# Patient Record
Sex: FEMALE | Race: BLACK OR AFRICAN AMERICAN | NOT HISPANIC OR LATINO | Employment: UNEMPLOYED | URBAN - METROPOLITAN AREA
[De-identification: names, ages, dates, MRNs, and addresses within clinical notes are randomized per-mention and may not be internally consistent; named-entity substitution may affect disease eponyms.]

---

## 2020-09-07 ENCOUNTER — OFFICE VISIT (OUTPATIENT)
Dept: URGENT CARE | Facility: CLINIC | Age: 20
End: 2020-09-07
Payer: MEDICAID

## 2020-09-07 ENCOUNTER — HOSPITAL ENCOUNTER (EMERGENCY)
Facility: OTHER | Age: 20
Discharge: HOME | End: 2020-09-07
Attending: EMERGENCY MEDICINE
Payer: MEDICAID

## 2020-09-07 VITALS
RESPIRATION RATE: 16 BRPM | HEART RATE: 73 BPM | WEIGHT: 140 LBS | TEMPERATURE: 98 F | OXYGEN SATURATION: 100 % | DIASTOLIC BLOOD PRESSURE: 86 MMHG | BODY MASS INDEX: 24.8 KG/M2 | HEIGHT: 63 IN | SYSTOLIC BLOOD PRESSURE: 132 MMHG

## 2020-09-07 DIAGNOSIS — O20.0 THREATENED MISCARRIAGE: Primary | ICD-10-CM

## 2020-09-07 DIAGNOSIS — O36.80X0 PREGNANCY OF UNKNOWN ANATOMIC LOCATION: ICD-10-CM

## 2020-09-07 PROBLEM — Z32.01 POSITIVE PREGNANCY TEST: Status: ACTIVE | Noted: 2020-09-07

## 2020-09-07 LAB
ABO + RH BLD: NORMAL
B-HCG UR QL: POSITIVE
BACTERIA #/AREA URNS HPF: ABNORMAL /HPF
BASOPHILS # BLD AUTO: 0.01 K/UL (ref 0–0.2)
BASOPHILS NFR BLD: 0.2 % (ref 0–1.9)
BILIRUB UR QL STRIP: NEGATIVE
CLARITY UR: CLEAR
COLOR UR: YELLOW
CTP QC/QA: YES
DIFFERENTIAL METHOD: ABNORMAL
EOSINOPHIL # BLD AUTO: 0 K/UL (ref 0–0.5)
EOSINOPHIL NFR BLD: 0.6 % (ref 0–8)
ERYTHROCYTE [DISTWIDTH] IN BLOOD BY AUTOMATED COUNT: 13.5 % (ref 11.5–14.5)
GLUCOSE UR QL STRIP: NEGATIVE
HCG INTACT+B SERPL-ACNC: 434 MIU/ML
HCT VFR BLD AUTO: 42.7 % (ref 37–48.5)
HGB BLD-MCNC: 13.6 G/DL (ref 12–16)
HGB UR QL STRIP: ABNORMAL
IMM GRANULOCYTES # BLD AUTO: 0.01 K/UL (ref 0–0.04)
IMM GRANULOCYTES NFR BLD AUTO: 0.2 % (ref 0–0.5)
KETONES UR QL STRIP: NEGATIVE
LEUKOCYTE ESTERASE UR QL STRIP: NEGATIVE
LYMPHOCYTES # BLD AUTO: 2 K/UL (ref 1–4.8)
LYMPHOCYTES NFR BLD: 31.2 % (ref 18–48)
MCH RBC QN AUTO: 28 PG (ref 27–31)
MCHC RBC AUTO-ENTMCNC: 31.9 G/DL (ref 32–36)
MCV RBC AUTO: 88 FL (ref 82–98)
MICROSCOPIC COMMENT: ABNORMAL
MONOCYTES # BLD AUTO: 0.5 K/UL (ref 0.3–1)
MONOCYTES NFR BLD: 7.2 % (ref 4–15)
NEUTROPHILS # BLD AUTO: 3.9 K/UL (ref 1.8–7.7)
NEUTROPHILS NFR BLD: 60.6 % (ref 38–73)
NITRITE UR QL STRIP: NEGATIVE
NRBC BLD-RTO: 0 /100 WBC
PH UR STRIP: 6 [PH] (ref 5–8)
PLATELET # BLD AUTO: 252 K/UL (ref 150–350)
PMV BLD AUTO: 10.7 FL (ref 9.2–12.9)
PROT UR QL STRIP: NEGATIVE
RBC # BLD AUTO: 4.85 M/UL (ref 4–5.4)
RBC #/AREA URNS HPF: 5 /HPF (ref 0–4)
SP GR UR STRIP: 1.02 (ref 1–1.03)
SQUAMOUS #/AREA URNS HPF: 9 /HPF
URN SPEC COLLECT METH UR: ABNORMAL
UROBILINOGEN UR STRIP-ACNC: NEGATIVE EU/DL
WBC # BLD AUTO: 6.5 K/UL (ref 3.9–12.7)

## 2020-09-07 PROCEDURE — 86901 BLOOD TYPING SEROLOGIC RH(D): CPT

## 2020-09-07 PROCEDURE — 99283 EMERGENCY DEPT VISIT LOW MDM: CPT | Mod: ,,, | Performed by: OBSTETRICS & GYNECOLOGY

## 2020-09-07 PROCEDURE — 99284 EMERGENCY DEPT VISIT MOD MDM: CPT | Mod: 25

## 2020-09-07 PROCEDURE — 99283 PR EMERGENCY DEPT VISIT,LEVEL III: ICD-10-PCS | Mod: ,,, | Performed by: OBSTETRICS & GYNECOLOGY

## 2020-09-07 PROCEDURE — 85025 COMPLETE CBC W/AUTO DIFF WBC: CPT

## 2020-09-07 PROCEDURE — 81000 URINALYSIS NONAUTO W/SCOPE: CPT

## 2020-09-07 PROCEDURE — 84702 CHORIONIC GONADOTROPIN TEST: CPT

## 2020-09-07 PROCEDURE — 81025 URINE PREGNANCY TEST: CPT | Performed by: PHYSICIAN ASSISTANT

## 2020-09-07 NOTE — DISCHARGE INSTRUCTIONS
You need to return to [facility] (2nd floor across from [facility]) for repeat blood work (hormone level)

## 2020-09-07 NOTE — ED NOTES
Pt in room #11 with c/o vaginal bleeding while 5 weeks pregnant.  grava 3, para 1 3yr old, and 1 miscarriage at 10wks. PA already in room to see pt. [facility] med

## 2020-09-07 NOTE — ED PROVIDER NOTES
Encounter Date: 2020       History     Chief Complaint   Patient presents with    Vaginal Bleeding     pt with c/o spotting inearly pg. pt  is five weeks.      Patient is a 20-year-old A1 female, approximately 5 weeks digital age, presenting to the emergency department with vaginal spotting.  Patient reports pink, scant vaginal spotting which began last night.  She states this morning the spotting was a little heavier and brighter red.  She has not worn a pad.  She only reports blood with wiping.  She does admit to recent intercourse.  She reports mild pelvic cramping.  She is not passing clots or tissue.  She is in evacuated from [place].  She has not established OB Gyn care.    The history is provided by the patient.     Review of patient's allergies indicates:  No Known Allergies  No past medical history on file.  No past surgical history on file.  No family history on file.  Social History     Tobacco Use    Smoking status: Not on file   Substance Use Topics    Alcohol use: Not on file    Drug use: Not on file     Review of Systems   Constitutional: Negative for chills and fever.   HENT: Negative for congestion and sore throat.    Respiratory: Negative for shortness of breath.    Cardiovascular: Negative for chest pain.   Gastrointestinal: Negative for abdominal pain, diarrhea, nausea and vomiting.   Genitourinary: Positive for pelvic pain (mild) and vaginal bleeding. Negative for dysuria.   Musculoskeletal: Negative for arthralgias.   Skin: Negative for rash.   Allergic/Immunologic: Negative for immunocompromised state.   Neurological: Negative for headaches.       Physical Exam     Initial Vitals [20 1643]   BP Pulse Resp Temp SpO2   (!) 141/89 73 18 98.1 °F (36.7 °C) 100 %      MAP       --         Physical Exam    Vitals reviewed.  Constitutional: Vital signs are normal. She is cooperative.  Non-toxic appearance. She does not have a sickly appearance. No distress.   HENT:   Head:  Normocephalic and atraumatic.   Eyes: Conjunctivae and EOM are normal.   Neck: Normal range of motion. Neck supple.   Cardiovascular: Normal rate, regular rhythm and intact distal pulses.   Pulmonary/Chest: Breath sounds normal. No respiratory distress.   Abdominal: Soft. Bowel sounds are normal. There is no abdominal tenderness. There is no rebound.   Musculoskeletal: Normal range of motion.   Neurological: She is alert and oriented to person, place, and time. GCS eye subscore is 4. GCS verbal subscore is 5. GCS motor subscore is 6.   Skin: Skin is warm and dry. No rash noted.         ED Course   Procedures  Labs Reviewed   CBC W/ AUTO DIFFERENTIAL - Abnormal; Notable for the following components:       Result Value    Mean Corpuscular Hemoglobin Conc 31.9 (*)     All other components within normal limits   URINALYSIS, REFLEX TO URINE CULTURE - Abnormal; Notable for the following components:    Occult Blood UA 1+ (*)     All other components within normal limits    Narrative:     Specimen Source->Urine   URINALYSIS MICROSCOPIC - Abnormal; Notable for the following components:    RBC, UA 5 (*)     All other components within normal limits    Narrative:     Specimen Source->Urine   POCT URINE PREGNANCY - Abnormal; Notable for the following components:    POC Preg Test, Ur Positive (*)     All other components within normal limits   HCG, QUANTITATIVE, PREGNANCY   GROUP & RH          Imaging Results           US OB <14 Wks TransAbd & TransVag, Single Gestation (XPD) (Final result)  Result time 09/07/20 19:09:07    Final result by Violetta York MD (09/07/20 19:09:07)                 Impression:      No IUP at this time.  Complex circular structure in the right adnexa with tiny anechoic center.  Positive pregnancy test.  An ectopic cannot be entirely excluded.  Other considerations may include a very early pregnancy or spontaneous AB.    Complex right ovarian cystic lesions.  Recommend follow-up in 4-6  weeks.    Findings called to Dr. Pyle on 09/07/2020 at 19:08.    This report was flagged in Epic as abnormal.      Electronically signed by: Violetta York  Date:    09/07/2020  Time:    19:09             Narrative:    EXAMINATION:  ULTRASOUND OBSTETRICAL ULTRASOUND LESS THAN 14 WEEKS WITH TRANSVAGINAL    CLINICAL HISTORY:  Vag Bleeding;    TECHNIQUE:  Real-time ultrasound obstetrical ultrasound less than 14 weeks was performed transabdominally and  transvaginally.    COMPARISON:  None.    FINDINGS:  The uterus measures 7.9 x 4.1 x 4.8 cm. There are no uterine masses.  No fetal pole, yolk sac, or gestational sac is visualized.  The endometrium measures 2.2 cm.    The right ovary measures 4.1 x 3.8 x 4.9 cm.  Two complex right cystic ovarian areas are seen.  Both may have some mural wall nodularity and low level internal echoes.  The smaller of the 2 has a thin internal septation.  One measures 3.1 x 1.8 x 3.3 cm, and the other measures 2.4 x 2.3 x 2.4 cm.  The left ovary measures 2.4 x 2.4 x 2.0 cm.    A complex circular structure seen in the right adnexa adjacent to the right ovary with a tiny anechoic center, which measures 1.2 x 1.2 x 1.1 cm.    There is no free fluid in the cul-de-sac.                                 Medical Decision Making:   Initial Assessment:   Urgent evaluation of a 20 y.o. female presenting to the emergency department complaining of vaginal bleeding during pregnancy. Patient is afebrile, nontoxic appearing and hemodynamically stable.  -patient with light spotting and mild cramping since last night.  Patient has no tenderness to palpation of abdomen on exam.  -will obtain CBC, Rh screen, beta HCG and pelvis ultrasound.  ED Management:  -urinalysis without signs of UTI.  -CBC with no signs of anemia.  -she is Rh positive.  -beta HCG is 434.  - ultrasound reveals no IUP at this time.  There is a complex, circular structure in the right adnexa.  Ectopic cannot be excluded at this time.  -Ob  Gyne consulted.  Patient does not have focal area of tenderness to her right adnexal region.  -patient stable for discharge after Ob Gyne consult.  Will have repeat beta HCG in 2 days.  -she had no other complaints today and was stable at discharge.                   ED Course as of Sep 07 1913   Mon Sep 07, 2020   1853 Spoke with Dr. Harris, OBGYN regarding ultrasound finding of right adnexal structure.  Will review imaging with her staff.    [AG]      ED Course User Index  [AG] Roman Pyle PA-C                Clinical Impression:     1. Threatened miscarriage    2. Pregnancy of unknown anatomic location                               Roman Pyle PA-C  09/07/20 2046

## 2020-09-08 NOTE — ASSESSMENT & PLAN NOTE
1. Normal IUP vs ectopic vs early pregnancy loss  - VSS and hemodynamically stable  - Spotting and cramping mild with no products passed   - B hCG 434 and inconclusive for location or viability of pregnancy. Will require follow up  - US inconclusive. R ovarian complex circular structure may be incidental finding but cannot rule out early ectopic pregnancy.   - Will f/u with beta hCGs every 48 hours until an IUP, ectopic, or early pregnancy loss is determined.

## 2020-09-08 NOTE — SUBJECTIVE & OBJECTIVE
OB History    Para Term  AB Living   3 1 1 0 1 0   SAB TAB Ectopic Multiple Live Births   1 0 0 0 0      # Outcome Date GA Lbr Manan/2nd Weight Sex Delivery Anes PTL Lv   3             2 SAB            1 Term               Obstetric Comments   1st preg  - 3yr old child, 2nd miscarriage at 10 weeks, currently pregnant     No past medical history on file.  No past surgical history on file.    (Not in a hospital admission)      Review of patient's allergies indicates:  No Known Allergies     Family History     None        Tobacco Use    Smoking status: Not on file   Substance and Sexual Activity    Alcohol use: Not on file    Drug use: Not on file    Sexual activity: Not on file     Review of Systems   Constitutional: Negative for chills and fatigue.   Eyes: Negative for visual disturbance.   Respiratory: Negative for cough and shortness of breath.    Cardiovascular: Negative for chest pain.   Gastrointestinal: Positive for abdominal pain (mild abdominal cramping) and nausea. Negative for vomiting.   Endocrine: Negative for diabetes.   Genitourinary: Positive for vaginal bleeding (spotting after wiping with toilet paper) and vaginal pain. Negative for dysuria and vaginal discharge.   Musculoskeletal: Negative for back pain.   Neurological: Negative for headaches.      Objective:     Vital Signs (Most Recent):  Temp: 98.1 °F (36.7 °C) (20 1643)  Pulse: 77 (20)  Resp: 18 (20)  BP: 131/86 (20)  SpO2: 97 % (20) Vital Signs (24h Range):  Temp:  [98.1 °F (36.7 °C)] 98.1 °F (36.7 °C)  Pulse:  [73-77] 77  Resp:  [18] 18  SpO2:  [97 %-100 %] 97 %  BP: (131-141)/(86-89) 131/86     Weight: 63.5 kg (140 lb)  Body mass index is 24.8 kg/m².    No LMP recorded.    Physical Exam:   Constitutional: She appears well-developed and well-nourished.    HENT:   Head: Normocephalic.     Neck: Normal range of motion.    Cardiovascular: Normal rate.      Pulmonary/Chest: She is in respiratory distress.        Abdominal: Soft. She exhibits no distension. There is no abdominal tenderness. There is no rebound and no guarding.     Genitourinary:    Vagina, right adnexa and left adnexa normal.   There is no lesion on the right labia. There is no lesion on the left labia. Uterus is not enlarged and not tender. There is a normal right adnexa and a normal left adnexa. Right adnexum displays no mass and no tenderness. Left adnexum displays no mass and no tenderness. No tenderness or bleeding in the vagina. Cervix exhibits no motion tenderness.                     Laboratory:  CBC:   Recent Labs   Lab 20  1738   WBC 6.50   RBC 4.85   HGB 13.6   HCT 42.7      MCV 88   MCH 28.0   MCHC 31.9*     Positive pregnancy test    Beta hC      Diagnostic Results:  Ultrasound reviewed. No intrauterine pregnancy visualized. Endometrium measures 2.2cm.   Complex circular structure in right adnexa and cannot rule out ectompic pregnancy.

## 2020-09-08 NOTE — CONSULTS
[facility]  Obstetrics & Gynecology  Consult Note    Patient Name: Bijal Villa  MRN: [id number]  Admission Date: 2020  Hospital Length of Stay: 0 days  Code Status: No Order  Primary Care Provider: Primary Doctor No  Principal Problem: <principal problem not specified>    Consults  Subjective:     Chief Complaint: Vaginal bleeding    History of Present Illness:  Patient is a 20 year old  who presents for vaginal spotting and cramping. She had a positive pregnancy test at home. She states that she noticed blood on the toilet paper last night and a little more in the morning today after using the bathroom. She has not worn a pad and has had no bleeding in her underwear. States that her last period was on . She endorses mild intermittent abdominal cramping with no passage of products. She reports mild nausea every morning. Denies fever or chills. She is not using contraception and is sexually active. This is a desired but not planned pregnancy. She is from [place] and does not have a primary OB.        OB History    Para Term  AB Living   3 1 1 0 1 0   SAB TAB Ectopic Multiple Live Births   1 0 0 0 0      # Outcome Date GA Lbr Manan/2nd Weight Sex Delivery Anes PTL Lv   3             2 SAB            1 Term               Obstetric Comments   1st preg  - 3yr old child, 2nd miscarriage at 10 weeks, currently pregnant     No past medical history on file.  No past surgical history on file.    (Not in a hospital admission)      Review of patient's allergies indicates:  No Known Allergies     Family History     None        Tobacco Use    Smoking status: Not on file   Substance and Sexual Activity    Alcohol use: Not on file    Drug use: Not on file    Sexual activity: Not on file     Review of Systems   Constitutional: Negative for chills and fatigue.   Eyes: Negative for visual disturbance.   Respiratory: Negative for cough and shortness of breath.     Cardiovascular: Negative for chest pain.   Gastrointestinal: Positive for abdominal pain (mild abdominal cramping) and nausea. Negative for vomiting.   Endocrine: Negative for diabetes.   Genitourinary: Positive for vaginal bleeding (spotting after wiping with toilet paper) and vaginal pain. Negative for dysuria and vaginal discharge.   Musculoskeletal: Negative for back pain.   Neurological: Negative for headaches.      Objective:     Vital Signs (Most Recent):  Temp: 98.1 °F (36.7 °C) (20 1643)  Pulse: 77 (20)  Resp: 18 (20)  BP: 131/86 (20)  SpO2: 97 % (20) Vital Signs (24h Range):  Temp:  [98.1 °F (36.7 °C)] 98.1 °F (36.7 °C)  Pulse:  [73-77] 77  Resp:  [18] 18  SpO2:  [97 %-100 %] 97 %  BP: (131-141)/(86-89) 131/86     Weight: 63.5 kg (140 lb)  Body mass index is 24.8 kg/m².    No LMP recorded.    Physical Exam:   Constitutional: She appears well-developed and well-nourished.    HENT:   Head: Normocephalic.     Neck: Normal range of motion.    Cardiovascular: Normal rate.     Pulmonary/Chest: She is in respiratory distress.        Abdominal: Soft. She exhibits no distension. There is no abdominal tenderness. There is no rebound and no guarding.     Genitourinary:    Vagina, right adnexa and left adnexa normal.   There is no lesion on the right labia. There is no lesion on the left labia. Uterus is not enlarged and not tender. There is a normal right adnexa and a normal left adnexa. Right adnexum displays no mass and no tenderness. Left adnexum displays no mass and no tenderness. No tenderness or bleeding in the vagina. Cervix exhibits no motion tenderness.                     Laboratory:  CBC:   Recent Labs   Lab 20  1738   WBC 6.50   RBC 4.85   HGB 13.6   HCT 42.7      MCV 88   MCH 28.0   MCHC 31.9*     Positive pregnancy test    Beta hC      Diagnostic Results:  Ultrasound reviewed. No intrauterine pregnancy visualized. Endometrium  measures 2.2cm.   Complex circular structure in right adnexa and cannot rule out ectompic pregnancy.      Assessment/Plan:     Positive pregnancy test  1. Normal IUP vs ectopic vs early pregnancy loss  - VSS and hemodynamically stable  - Spotting and cramping mild with no products passed   - B hCG 434 and inconclusive for location or viability of pregnancy. Will require follow up  - US inconclusive. R ovarian complex circular structure may be incidental finding but cannot rule out early ectopic pregnancy.   - Will f/u with beta hCGs every 48 hours until an IUP, ectopic, or early pregnancy loss is determined.        Thank you for your consult. I will follow-up with patient. Please contact us if you have any additional questions.    Soni Allen MD PGY1  Obstetrics & Gynecology  [facility]

## 2020-09-08 NOTE — HPI
Patient is a 20 year old  who presents for vaginal spotting and cramping. She had a positive pregnancy test at home. She states that she noticed blood on the toilet paper last night and a little more in the morning today after using the bathroom. She has not worn a pad and has had no bleeding in her underwear. States that her last period was on . She endorses mild intermittent abdominal cramping with no passage of products. She reports mild nausea every morning. Denies fever or chills. She is not using contraception and is sexually active. This is a desired but not planned pregnancy. She is from [place] and does not have a primary OB.

## 2020-09-09 ENCOUNTER — LAB VISIT (OUTPATIENT)
Dept: LAB | Facility: OTHER | Age: 20
End: 2020-09-09
Attending: STUDENT IN AN ORGANIZED HEALTH CARE EDUCATION/TRAINING PROGRAM
Payer: MEDICAID

## 2020-09-09 DIAGNOSIS — O20.0 THREATENED MISCARRIAGE: ICD-10-CM

## 2020-09-09 LAB — HCG INTACT+B SERPL-ACNC: 370 MIU/ML

## 2020-09-09 PROCEDURE — 84702 CHORIONIC GONADOTROPIN TEST: CPT

## 2020-09-09 PROCEDURE — 36415 COLL VENOUS BLD VENIPUNCTURE: CPT

## 2020-09-14 ENCOUNTER — TELEPHONE (OUTPATIENT)
Dept: OBSTETRICS AND GYNECOLOGY | Facility: HOSPITAL | Age: 20
End: 2020-09-14

## 2020-09-14 DIAGNOSIS — O03.9 MISCARRIAGE: Primary | ICD-10-CM

## 2020-09-14 NOTE — TELEPHONE ENCOUNTER
Called patient regarding b HCG follow up. She was evaluated at [facility] ED on 8/9/20 for vaginal bleeding. B HCG was was 434 at that time and TVUS revealed no IUP. She was instructed to f/u in 48 hours for repeat bHCG given pregnancy of unknown location. Repeat beta on 9/9 had decreased to 370. Patient was informed that this decrease likely indicates that this is not a viable pregnancy and that it will likely resolve on its own. She was instructed to f/u weekly with betas until <5 due to the pregnancy being of unknown location. Patient voiced understanding. Bleeding and cramping precautions were given. Standing order for weekly bHCGs placed until <5.     Soni Allen MD PGY-1  Obstetrics and Gynecology

## 2020-09-22 ENCOUNTER — LAB VISIT (OUTPATIENT)
Dept: LAB | Facility: OTHER | Age: 20
End: 2020-09-22
Attending: STUDENT IN AN ORGANIZED HEALTH CARE EDUCATION/TRAINING PROGRAM
Payer: MEDICAID

## 2020-09-22 DIAGNOSIS — O20.0 THREATENED MISCARRIAGE: ICD-10-CM

## 2020-09-22 LAB — HCG INTACT+B SERPL-ACNC: 17 MIU/ML

## 2020-09-22 PROCEDURE — 84702 CHORIONIC GONADOTROPIN TEST: CPT

## 2020-09-22 PROCEDURE — 36415 COLL VENOUS BLD VENIPUNCTURE: CPT

## 2020-09-25 ENCOUNTER — TELEPHONE (OUTPATIENT)
Dept: OBSTETRICS AND GYNECOLOGY | Facility: HOSPITAL | Age: 20
End: 2020-09-25

## 2020-09-25 NOTE — TELEPHONE ENCOUNTER
Called patient regarding b HCG follow up. She was evaluated at [facility] ED on 8/9/20 for vaginal bleeding. B HCG was was 434 at that time and TVUS revealed no IUP. Repeat beta was 370 and beta on 9/23 was 17. Patient was informed that this decrease likely indicates that this is not a viable pregnancy. She was instructed to f/u weekly with betas until <5 due to the pregnancy being of unknown location. Patient voiced understanding. Bleeding and cramping precautions were given. Patient denies any significant bleeding, cramping, or abdominal pain. Standing order for weekly bHCGs placed until <5.     Soni Allen MD PGY-1  Obstetrics and Gynecology

## 2020-09-30 ENCOUNTER — TELEPHONE (OUTPATIENT)
Dept: OBSTETRICS AND GYNECOLOGY | Facility: HOSPITAL | Age: 20
End: 2020-09-30

## 2020-09-30 NOTE — TELEPHONE ENCOUNTER
Spoke to Ms. Villa regarding b HCG follow up. She was evaluated at [facility] ED on 8/9/20 for vaginal bleeding. B HCG was was 434 at that time and TVUS revealed no IUP. Repeat beta was 370 and beta on 9/23 was 17. She reports no vaginal bleeding or cramping. Discussed need to follow betas until <5 given pregnancy of unknown location. Patient states she will go to [facility] lab tomorrow. Will follow up with her when resulted.    Soni Allen MD PGY-1  Obstetrics and Gynecology

## 2020-10-05 ENCOUNTER — TELEPHONE (OUTPATIENT)
Dept: OBSTETRICS AND GYNECOLOGY | Facility: HOSPITAL | Age: 20
End: 2020-10-05

## 2020-10-05 NOTE — TELEPHONE ENCOUNTER
Spoke to Ms. Villa regarding b HCG follow up. She was evaluated at [facility] ED on 8/9/20 for vaginal bleeding. B HCG was was 434 at that time and TVUS revealed no IUP. Repeat beta was 370 and beta on 9/23 was 17. She reports no vaginal bleeding or cramping. Discussed need to follow betas until <5 given pregnancy of unknown location. Patient states she will go to [facility] tomorrow and that she was busy traveling last week. Will follow up with her when resulted.    Soni Allen MD PGY-1  Obstetrics and Gynecology

## 2020-10-30 ENCOUNTER — TELEPHONE (OUTPATIENT)
Dept: OBSTETRICS AND GYNECOLOGY | Facility: HOSPITAL | Age: 20
End: 2020-10-30

## 2020-10-30 NOTE — TELEPHONE ENCOUNTER
Called patient regarding b-hcg labs for pregnancy of unknown location. Last value on 9/22 was 17. Requested that patient either present to hospital for repeat lab, or if unable to come in can take home pregnancy test and call with results. Hospital phone number given, pt told to ask for gynecology team. Pt voiced understanding and wishes to take home pregnancy test and call with result.  Will await call. If no call within one week, will call back.    Sushma K. Reddy, MD  PGY-4, OBGYN

## 2020-11-11 ENCOUNTER — TELEPHONE (OUTPATIENT)
Dept: OBSTETRICS AND GYNECOLOGY | Facility: HOSPITAL | Age: 20
End: 2020-11-11

## 2020-11-11 NOTE — TELEPHONE ENCOUNTER
Called and spoke to Bijal Waredwell about weekly beta hcg blood draws. Last hCG was 17 on 09/22. She took a home pregnancy test and it was negative. Discussed that we still like to see levels decline on quantitative testing to undetectable if she is able to come in. Standing hCG level order is in. Patient verbalized understanding and all questions answered. Precautions given.    Katherine Boecking MD   Ob/Gyn PGY 1  .

## 2020-11-18 ENCOUNTER — TELEPHONE (OUTPATIENT)
Dept: OBSTETRICS AND GYNECOLOGY | Facility: HOSPITAL | Age: 20
End: 2020-11-18

## 2020-11-18 NOTE — TELEPHONE ENCOUNTER
Called and spoke to Bijal Villa about weekly beta hcg blood draws. Last hCG was 17 on 09/22. She took a home pregnancy test and it was negative. Discussed that we still like to see levels decline on quantitative testing to undetectable if she is able to come in. Standing hCG level order is in. Patient verbalized understanding and all questions answered. Precautions given.    Caitlyn Diaz MD   PGY-1, OB-GYN

## 2022-09-28 ENCOUNTER — OFFICE VISIT (OUTPATIENT)
Dept: PRIMARY CARE CLINIC | Facility: CLINIC | Age: 22
End: 2022-09-28
Payer: MEDICAID

## 2022-09-28 VITALS
BODY MASS INDEX: 28.51 KG/M2 | HEIGHT: 64 IN | HEART RATE: 78 BPM | OXYGEN SATURATION: 100 % | DIASTOLIC BLOOD PRESSURE: 79 MMHG | WEIGHT: 167 LBS | SYSTOLIC BLOOD PRESSURE: 118 MMHG

## 2022-09-28 DIAGNOSIS — Z00.00 WELLNESS EXAMINATION: Primary | ICD-10-CM

## 2022-09-28 PROCEDURE — 3074F PR MOST RECENT SYSTOLIC BLOOD PRESSURE < 130 MM HG: ICD-10-PCS | Mod: CPTII,S$GLB,, | Performed by: INTERNAL MEDICINE

## 2022-09-28 PROCEDURE — 3078F PR MOST RECENT DIASTOLIC BLOOD PRESSURE < 80 MM HG: ICD-10-PCS | Mod: CPTII,S$GLB,, | Performed by: INTERNAL MEDICINE

## 2022-09-28 PROCEDURE — 3074F SYST BP LT 130 MM HG: CPT | Mod: CPTII,S$GLB,, | Performed by: INTERNAL MEDICINE

## 2022-09-28 PROCEDURE — 1159F MED LIST DOCD IN RCRD: CPT | Mod: CPTII,S$GLB,, | Performed by: INTERNAL MEDICINE

## 2022-09-28 PROCEDURE — 99385 PREV VISIT NEW AGE 18-39: CPT | Mod: S$GLB,,, | Performed by: INTERNAL MEDICINE

## 2022-09-28 PROCEDURE — 1159F PR MEDICATION LIST DOCUMENTED IN MEDICAL RECORD: ICD-10-PCS | Mod: CPTII,S$GLB,, | Performed by: INTERNAL MEDICINE

## 2022-09-28 PROCEDURE — 3078F DIAST BP <80 MM HG: CPT | Mod: CPTII,S$GLB,, | Performed by: INTERNAL MEDICINE

## 2022-09-28 PROCEDURE — 3008F PR BODY MASS INDEX (BMI) DOCUMENTED: ICD-10-PCS | Mod: CPTII,S$GLB,, | Performed by: INTERNAL MEDICINE

## 2022-09-28 PROCEDURE — 3008F BODY MASS INDEX DOCD: CPT | Mod: CPTII,S$GLB,, | Performed by: INTERNAL MEDICINE

## 2022-09-28 PROCEDURE — 99385 PR PREVENTIVE VISIT,NEW,18-39: ICD-10-PCS | Mod: S$GLB,,, | Performed by: INTERNAL MEDICINE

## 2022-09-28 NOTE — PROGRESS NOTES
Subjective:      Patient ID: Bijal Villa is a 22 y.o. female.    Chief Complaint: Establish Care    HPI    History reviewed. No pertinent past medical history.      History reviewed. No pertinent surgical history.     Family History   Problem Relation Age of Onset    No Known Problems Mother     No Known Problems Father        Social History     Socioeconomic History    Marital status: Single   Tobacco Use    Smoking status: Never     Passive exposure: Never    Smokeless tobacco: Never   Substance and Sexual Activity    Alcohol use: Not Currently    Drug use: Never       Has an 8 month old baby, not currently breastfeeding. Delivery was done by Dr Amaro       Review of Systems   Constitutional:  Negative for chills and fever.   Respiratory:  Negative for cough, shortness of breath and wheezing.    Cardiovascular:  Negative for chest pain, palpitations and leg swelling.   Gastrointestinal:  Negative for abdominal pain, constipation, diarrhea, nausea and vomiting.   Genitourinary:  Negative for dysuria, frequency and urgency.   Musculoskeletal:  Negative for falls.   Skin:  Negative for rash.   Neurological:  Negative for dizziness and headaches.   Psychiatric/Behavioral:  Negative for depression. The patient is not nervous/anxious.    Objective:     Physical Exam  Vitals reviewed.   Constitutional:       Appearance: Normal appearance.   HENT:      Head: Normocephalic.   Eyes:      Extraocular Movements: Extraocular movements intact.      Conjunctiva/sclera: Conjunctivae normal.      Pupils: Pupils are equal, round, and reactive to light.   Cardiovascular:      Rate and Rhythm: Normal rate and regular rhythm.   Pulmonary:      Effort: Pulmonary effort is normal.      Breath sounds: Normal breath sounds.   Abdominal:      General: Bowel sounds are normal.   Musculoskeletal:         General: Normal range of motion.   Skin:     General: Skin is warm.      Capillary Refill: Capillary refill takes less than 2  "seconds.   Neurological:      General: No focal deficit present.      Mental Status: She is alert and oriented to person, place, and time.   Psychiatric:         Mood and Affect: Mood normal.     /79 (BP Location: Left arm, Patient Position: Sitting, BP Method: Medium (Automatic))   Pulse 78   Ht 5' 4" (1.626 m)   Wt 75.8 kg (167 lb)   LMP 09/21/2022   SpO2 100%   BMI 28.67 kg/m²     Assessment:       ICD-10-CM ICD-9-CM   1. Wellness examination  Z00.00 V70.0       Plan:          Wellness examination         No current problems, return PRN               "

## 2023-06-20 ENCOUNTER — PATIENT MESSAGE (OUTPATIENT)
Dept: RESEARCH | Facility: HOSPITAL | Age: 23
End: 2023-06-20
Payer: MEDICAID